# Patient Record
Sex: MALE | Race: WHITE | Employment: OTHER | URBAN - METROPOLITAN AREA
[De-identification: names, ages, dates, MRNs, and addresses within clinical notes are randomized per-mention and may not be internally consistent; named-entity substitution may affect disease eponyms.]

---

## 2020-03-01 ENCOUNTER — HOSPITAL ENCOUNTER (EMERGENCY)
Age: 35
Discharge: HOME | End: 2020-03-01
Attending: EMERGENCY MEDICINE | Admitting: EMERGENCY MEDICINE
Payer: SELF-PAY

## 2020-03-01 ENCOUNTER — APPOINTMENT (OUTPATIENT)
Dept: GENERAL RADIOLOGY | Age: 35
End: 2020-03-01
Attending: PHYSICIAN ASSISTANT
Payer: SELF-PAY

## 2020-03-01 VITALS
OXYGEN SATURATION: 99 % | SYSTOLIC BLOOD PRESSURE: 125 MMHG | BODY MASS INDEX: 20.18 KG/M2 | TEMPERATURE: 97.5 F | HEIGHT: 72 IN | DIASTOLIC BLOOD PRESSURE: 90 MMHG | RESPIRATION RATE: 17 BRPM | HEART RATE: 66 BPM | WEIGHT: 149 LBS

## 2020-03-01 DIAGNOSIS — F41.1 ANXIETY STATE: Primary | ICD-10-CM

## 2020-03-01 LAB
ALBUMIN SERPL-MCNC: 4.2 G/DL (ref 3.4–5)
ALBUMIN/GLOB SERPL: 1.1 {RATIO} (ref 0.8–1.7)
ALP SERPL-CCNC: 101 U/L (ref 45–117)
ALT SERPL-CCNC: 24 U/L (ref 16–61)
ANION GAP SERPL CALC-SCNC: 6 MMOL/L (ref 3–18)
AST SERPL-CCNC: 21 U/L (ref 10–38)
ATRIAL RATE: 66 BPM
BASOPHILS # BLD: 0 K/UL (ref 0–0.1)
BASOPHILS NFR BLD: 0 % (ref 0–2)
BILIRUB SERPL-MCNC: 0.2 MG/DL (ref 0.2–1)
BUN SERPL-MCNC: 10 MG/DL (ref 7–18)
BUN/CREAT SERPL: 9 (ref 12–20)
CALCIUM SERPL-MCNC: 9 MG/DL (ref 8.5–10.1)
CALCULATED P AXIS, ECG09: 63 DEGREES
CALCULATED R AXIS, ECG10: 65 DEGREES
CALCULATED T AXIS, ECG11: 61 DEGREES
CHLORIDE SERPL-SCNC: 103 MMOL/L (ref 100–111)
CO2 SERPL-SCNC: 30 MMOL/L (ref 21–32)
CREAT SERPL-MCNC: 1.17 MG/DL (ref 0.6–1.3)
DIAGNOSIS, 93000: NORMAL
DIFFERENTIAL METHOD BLD: ABNORMAL
EOSINOPHIL # BLD: 0.1 K/UL (ref 0–0.4)
EOSINOPHIL NFR BLD: 1 % (ref 0–5)
ERYTHROCYTE [DISTWIDTH] IN BLOOD BY AUTOMATED COUNT: 12.3 % (ref 11.6–14.5)
GLOBULIN SER CALC-MCNC: 3.8 G/DL (ref 2–4)
GLUCOSE SERPL-MCNC: 99 MG/DL (ref 74–99)
HCT VFR BLD AUTO: 47.1 % (ref 36–48)
HGB BLD-MCNC: 16 G/DL (ref 13–16)
LYMPHOCYTES # BLD: 2 K/UL (ref 0.9–3.6)
LYMPHOCYTES NFR BLD: 19 % (ref 21–52)
MAGNESIUM SERPL-MCNC: 2.1 MG/DL (ref 1.6–2.6)
MCH RBC QN AUTO: 32.4 PG (ref 24–34)
MCHC RBC AUTO-ENTMCNC: 34 G/DL (ref 31–37)
MCV RBC AUTO: 95.3 FL (ref 74–97)
MONOCYTES # BLD: 0.6 K/UL (ref 0.05–1.2)
MONOCYTES NFR BLD: 6 % (ref 3–10)
NEUTS SEG # BLD: 8.1 K/UL (ref 1.8–8)
NEUTS SEG NFR BLD: 74 % (ref 40–73)
P-R INTERVAL, ECG05: 160 MS
PLATELET # BLD AUTO: 231 K/UL (ref 135–420)
PMV BLD AUTO: 11.4 FL (ref 9.2–11.8)
POTASSIUM SERPL-SCNC: 4 MMOL/L (ref 3.5–5.5)
PROT SERPL-MCNC: 8 G/DL (ref 6.4–8.2)
Q-T INTERVAL, ECG07: 388 MS
QRS DURATION, ECG06: 72 MS
QTC CALCULATION (BEZET), ECG08: 406 MS
RBC # BLD AUTO: 4.94 M/UL (ref 4.7–5.5)
SODIUM SERPL-SCNC: 139 MMOL/L (ref 136–145)
TROPONIN I SERPL-MCNC: <0.02 NG/ML (ref 0–0.04)
VENTRICULAR RATE, ECG03: 66 BPM
WBC # BLD AUTO: 10.9 K/UL (ref 4.6–13.2)

## 2020-03-01 PROCEDURE — 84484 ASSAY OF TROPONIN QUANT: CPT

## 2020-03-01 PROCEDURE — 85025 COMPLETE CBC W/AUTO DIFF WBC: CPT

## 2020-03-01 PROCEDURE — 83735 ASSAY OF MAGNESIUM: CPT

## 2020-03-01 PROCEDURE — 96374 THER/PROPH/DIAG INJ IV PUSH: CPT

## 2020-03-01 PROCEDURE — 74011250637 HC RX REV CODE- 250/637: Performed by: PHYSICIAN ASSISTANT

## 2020-03-01 PROCEDURE — 93005 ELECTROCARDIOGRAM TRACING: CPT

## 2020-03-01 PROCEDURE — 74011250636 HC RX REV CODE- 250/636: Performed by: EMERGENCY MEDICINE

## 2020-03-01 PROCEDURE — 99284 EMERGENCY DEPT VISIT MOD MDM: CPT

## 2020-03-01 PROCEDURE — 71045 X-RAY EXAM CHEST 1 VIEW: CPT

## 2020-03-01 PROCEDURE — 80053 COMPREHEN METABOLIC PANEL: CPT

## 2020-03-01 RX ORDER — LORAZEPAM 2 MG/ML
0.5 INJECTION INTRAMUSCULAR
Status: COMPLETED | OUTPATIENT
Start: 2020-03-01 | End: 2020-03-01

## 2020-03-01 RX ORDER — LORAZEPAM 1 MG/1
1 TABLET ORAL
Qty: 4 TAB | Refills: 0 | Status: SHIPPED | OUTPATIENT
Start: 2020-03-01

## 2020-03-01 RX ORDER — NITROGLYCERIN 0.4 MG/1
0.4 TABLET SUBLINGUAL
Status: DISCONTINUED | OUTPATIENT
Start: 2020-03-01 | End: 2020-03-01 | Stop reason: HOSPADM

## 2020-03-01 RX ORDER — GUAIFENESIN 100 MG/5ML
162 LIQUID (ML) ORAL
Status: COMPLETED | OUTPATIENT
Start: 2020-03-01 | End: 2020-03-01

## 2020-03-01 RX ADMIN — NITROGLYCERIN 0.4 MG: 0.4 TABLET SUBLINGUAL at 12:00

## 2020-03-01 RX ADMIN — ASPIRIN 81 MG 162 MG: 81 TABLET ORAL at 14:51

## 2020-03-01 RX ADMIN — LORAZEPAM 0.5 MG: 2 INJECTION INTRAMUSCULAR at 15:28

## 2020-03-01 NOTE — ED NOTES
Pt sitting on the side of the bed at this time. Denies any relief from medications given. On cardiac monitor w/ VSS.

## 2020-03-01 NOTE — ED TRIAGE NOTES
Pt arrives with chest pain, shortness of breath, sweating starting late last night and anxiety. Pt states he doesn't like hospitals so waited till today. Pt also hx of ureteral stents and hematuria yesterday.

## 2020-03-01 NOTE — ED TRIAGE NOTES
CP 9/10 starting last night. Now he feel SOB, diaphoresis. Early yesterday morning pt had hematuria. Hx of renal stents. I performed a brief evaluation, including history and physical, of the patient here in triage and I have determined that pt will need further treatment and evaluation from the main side ER physician. I have placed initial orders to help in expediting patients care.      March 01, 2020 at 1:30 PM - Lakeisha Ching PA-C        Visit Vitals  /86 (BP 1 Location: Right arm, BP Patient Position: Sitting)   Pulse 75   Temp 97.5 °F (36.4 °C)   Resp 20   Ht 6' (1.829 m)   Wt 67.6 kg (149 lb)   SpO2 100%   BMI 20.21 kg/m²

## 2020-03-01 NOTE — ED PROVIDER NOTES
[address] [facility]  EMERGENCY DEPARTMENT HISTORY AND PHYSICAL EXAM       Date: 3/1/2020   Patient Name: Candelario Ramirez   YOB: 1985  Medical Record Number: [id number]    HISTORY OF PRESENTING ILLNESS:     Candelario Ramirez is a 37 y.o. male presenting with the noted PMH to the ED c/o anxiety attack. Patient states that he started last night. He states that he feels really anxious. He feels pain in left side of his chest area. He says radiates to his back. He feels short of breath. States he had vomiting about 30 minutes ago. He states he has history of anxiety and panic attacks daily. He denies any recent traveling. Denies any cough or cold symptoms. He does smoke cigarettes and marijuana. He states he has problems with kidney stones for last 7 years and so he always has stents in place. So he always has hematuria. That is unchanged. Denies any problems with bowel movements. He states he been trying to wean himself off of his alprazolam.  He did take 2 mg yesterday. None today. He has been on Vicodin in the past for his kidney stones. He states he did take tramadol 3 days ago. He has been weaning himself off of narcotics as well. Denies any injury or trauma. Rest of 10 systems reviewed and negative.     Primary Care Provider: None   Specialist:    Past Medical History:   Past Medical History:   Diagnosis Date    Chronic kidney disease     Epilepsy ([address]     Hematuria     History of kidney stones     Loose, teeth     Lower abdominal pain     Marijuana use     Preop testing     Seizure ([address]     Slow urinary stream         Past Surgical History:   Past Surgical History:   Procedure Laterality Date    HX UROLOGICAL  01/11/2016    [facility]- Cystoscopy,URETEROSCOPY W/ BIOPSY-FULGURATION LESION, Dr Leyla Calvin     HX UROLOGICAL  02/12/2016    CYSTOURETHROSCOPY WITH STENT REMOVAL,  Dr Marti Maxwell EXTRACTION          Social History:   Social History     Tobacco Use    Smoking status: Current Every Day Smoker     Packs/day: 0.50     Types: Cigarettes    Smokeless tobacco: Never Used   Substance Use Topics    Alcohol use: Yes     Alcohol/week: 0.0 standard drinks     Comment: occ/ soical     Drug use: Yes     Frequency: 3.0 times per week     Types: Marijuana     Comment: occ;pot        Allergies: Allergies   Allergen Reactions    Immune Globulin (Human) (Igg) Anaphylaxis        REVIEW OF SYSTEMS:  Review of Systems      PHYSICAL EXAM:  Vitals:    03/01/20 1500 03/01/20 1515 03/01/20 1530 03/01/20 1545   BP: 133/78 126/84 127/85 132/76   Pulse: 66 69 82 70   Resp: 21 27 14 15   Temp:       SpO2: 100% 98% 97% 97%   Weight:       Height:           Physical Exam   Vital signs reviewed. Alert oriented x 3 in moderate distress. Tremulous and anxious. HEENT: normocephalic atraumatic. Eyes are PERRLA EOMI. Conjunctiva normal.    External ears and nose normal.    Neck: normal external exam. No midline neck or back TTP. Lungs are clear to ascultation bilaterally. normal effort  Heart is regular rate and rhythm with no murmurs. Abdomen soft and nontender. No rebound rigidity or guarding. Extremities: Moves all 4 extremities and no distress. Full range of motion. 2+ pulses and BCR in all 4 extremities. No edema. No calf ttp  Neuro: Normal gait. 5 out of 5 strength in all 4 extremities. No facial droop. Skin examination: intact. no rashes. No petechia or purpura.       Medications   nitroglycerin (NITROSTAT) tablet 0.4 mg (0.4 mg SubLINGual Given 3/1/20 1200)   aspirin chewable tablet 162 mg (162 mg Oral Given 3/1/20 1451)   LORazepam (ATIVAN) injection 0.5 mg (0.5 mg IntraVENous Given 3/1/20 1528)       RESULTS:    Labs -   Labs Reviewed   METABOLIC PANEL, COMPREHENSIVE - Abnormal; Notable for the following components:       Result Value    BUN/Creatinine ratio 9 (*)     All other components within normal limits   CBC WITH AUTOMATED DIFF - Abnormal; Notable for the following components:    NEUTROPHILS 74 (*)     LYMPHOCYTES 19 (*)     ABS. NEUTROPHILS 8.1 (*)     All other components within normal limits   TROPONIN I   MAGNESIUM   URINALYSIS W/ RFLX MICROSCOPIC       Radiologic Studies -  Xr Chest Port    Result Date: 3/1/2020  EXAM: XR CHEST PORT CLINICAL INDICATION/HISTORY: Chest Pain -Additional: None COMPARISON: None TECHNIQUE: Frontal view of the chest _______________ FINDINGS: HEART AND MEDIASTINUM: Midline cardiac silhouette, normal in size. Unremarkable hilar vascular structures. LUNGS AND PLEURAL SPACES: No focal consolidation, parenchymal opacity. No pneumothorax or pleural effusion. BONY THORAX AND SOFT TISSUES: No acute or destructive osseous abnormality. _______________     IMPRESSION: 1. No acute cardiopulmonary process. EKG interpretation:   Done at 1317 read by myself as normal sinus rhythm at 66 bpm.  No ST elevation. Normal axis. MEDICAL DECISION MAKING    1635. Patient reassessed. Feeling better. Patient now states that he ran out of his Xanax. Requesting medication for home. No evidence of pneumonia or pneumothorax. No ACS or STEMI. Abdomen soft and nonsurgical.  No signs or symptoms of acute appendicitis cholecystitis or pancreatitis. Patient with known history of anxiety attack. Recommend patient to follow-up with psychiatry to be rechecked or return if symptoms change or worsen or return. Patient states understanding and agrees with plan. Patient has no new complaints, changes, or physical findings. Results were reviewed with the patient. Pt's questions and concerns were addressed. Care plan was outlined, including follow-up with PCP/specialist and return precautions were discussed. Patient is felt to be stable for discharge at this time. Diagnosis   Clinical Impression:   1.  Anxiety state     history of generalized anxiety disorder        Follow-up Information     Follow up With Specialties Details Why Contact [address] Call in 1 day  [address] [place]  [phone]          Current Discharge Medication List      START taking these medications    Details   LORazepam (ATIVAN) 1 mg tablet Take 1 Tab by mouth every eight (8) hours as needed for Anxiety for up to 4 doses. Max Daily Amount: 3 mg. Qty: 4 Tab, Refills: 0    Associated Diagnoses: Anxiety state             Discharged in stable and improved condition. This chart was completed using Dragon, a dictation transcription service. Errors may have resulted from using this device.